# Patient Record
Sex: FEMALE | Race: BLACK OR AFRICAN AMERICAN | NOT HISPANIC OR LATINO
[De-identification: names, ages, dates, MRNs, and addresses within clinical notes are randomized per-mention and may not be internally consistent; named-entity substitution may affect disease eponyms.]

---

## 2017-06-25 PROBLEM — Z00.00 ENCOUNTER FOR PREVENTIVE HEALTH EXAMINATION: Status: ACTIVE | Noted: 2017-06-25

## 2021-07-09 ENCOUNTER — APPOINTMENT (OUTPATIENT)
Dept: OBGYN | Facility: CLINIC | Age: 36
End: 2021-07-09

## 2022-06-14 ENCOUNTER — NON-APPOINTMENT (OUTPATIENT)
Age: 37
End: 2022-06-14

## 2022-07-01 ENCOUNTER — NON-APPOINTMENT (OUTPATIENT)
Age: 37
End: 2022-07-01

## 2022-07-03 ENCOUNTER — EMERGENCY (EMERGENCY)
Facility: HOSPITAL | Age: 37
LOS: 0 days | Discharge: HOME | End: 2022-07-03
Attending: EMERGENCY MEDICINE | Admitting: EMERGENCY MEDICINE

## 2022-07-03 VITALS
WEIGHT: 179.9 LBS | SYSTOLIC BLOOD PRESSURE: 153 MMHG | RESPIRATION RATE: 16 BRPM | OXYGEN SATURATION: 99 % | HEART RATE: 97 BPM | TEMPERATURE: 98 F | DIASTOLIC BLOOD PRESSURE: 83 MMHG

## 2022-07-03 DIAGNOSIS — K08.89 OTHER SPECIFIED DISORDERS OF TEETH AND SUPPORTING STRUCTURES: ICD-10-CM

## 2022-07-03 DIAGNOSIS — Z20.822 CONTACT WITH AND (SUSPECTED) EXPOSURE TO COVID-19: ICD-10-CM

## 2022-07-03 DIAGNOSIS — K02.9 DENTAL CARIES, UNSPECIFIED: ICD-10-CM

## 2022-07-03 LAB
APPEARANCE UR: CLEAR — SIGNIFICANT CHANGE UP
BACTERIA # UR AUTO: ABNORMAL
BILIRUB UR-MCNC: NEGATIVE — SIGNIFICANT CHANGE UP
COLOR SPEC: YELLOW — SIGNIFICANT CHANGE UP
DIFF PNL FLD: NEGATIVE — SIGNIFICANT CHANGE UP
FLUAV AG NPH QL: SIGNIFICANT CHANGE UP
FLUBV AG NPH QL: SIGNIFICANT CHANGE UP
GLUCOSE UR QL: NEGATIVE — SIGNIFICANT CHANGE UP
KETONES UR-MCNC: NEGATIVE — SIGNIFICANT CHANGE UP
LEUKOCYTE ESTERASE UR-ACNC: NEGATIVE — SIGNIFICANT CHANGE UP
NITRITE UR-MCNC: NEGATIVE — SIGNIFICANT CHANGE UP
PH UR: 6 — SIGNIFICANT CHANGE UP (ref 5–8)
PROT UR-MCNC: ABNORMAL
RBC CASTS # UR COMP ASSIST: 0 /HPF — SIGNIFICANT CHANGE UP (ref 0–4)
RSV RNA NPH QL NAA+NON-PROBE: SIGNIFICANT CHANGE UP
SARS-COV-2 RNA SPEC QL NAA+PROBE: SIGNIFICANT CHANGE UP
SP GR SPEC: 1.02 — SIGNIFICANT CHANGE UP (ref 1.01–1.03)
UROBILINOGEN FLD QL: SIGNIFICANT CHANGE UP
WBC UR QL: 5 /HPF — SIGNIFICANT CHANGE UP (ref 0–5)

## 2022-07-03 PROCEDURE — 99284 EMERGENCY DEPT VISIT MOD MDM: CPT

## 2022-07-03 RX ORDER — AMOXICILLIN 250 MG/5ML
1 SUSPENSION, RECONSTITUTED, ORAL (ML) ORAL
Qty: 21 | Refills: 0
Start: 2022-07-03 | End: 2022-07-09

## 2022-07-03 NOTE — ED PROVIDER NOTE - CLINICAL SUMMARY MEDICAL DECISION MAKING FREE TEXT BOX
Patient with dental pain as well as URI symptoms.  Patient seen by dental block administered will change antibiotics to amoxicillin.  Viral swab as well as UA negative here.

## 2022-07-03 NOTE — ED PROVIDER NOTE - OBJECTIVE STATEMENT
36-year-old female presents ED complaining of right upper dental pain for a month with increasing intensity today.  Patient also complaining of cough runny nose and fever and chills.  Patient states he was started on Keflex for UTI after virtual visit yesterday.  No chest pain, shortness of breath, abdominal pain, back pain.

## 2022-07-03 NOTE — ED PROVIDER NOTE - PATIENT PORTAL LINK FT
You can access the FollowMyHealth Patient Portal offered by [facility] by registering at the following website: [email or web address]. By joining [facility]’s FollowMyHealth portal, you will also be able to view your health information using other applications (apps) compatible with our system.

## 2022-07-03 NOTE — ED PROVIDER NOTE - ATTENDING APP SHARED VISIT CONTRIBUTION OF CARE
36-year-old female here evaluation of right upper dental pain associated with cough runny nose fever chills.  Patient was recently started on Keflex UTI from a virtual visit but here when she started fever 1 to get checked out.  Patient has no lower back pain shortness of breath.  Agree with above exam.  Impression  Patient with dental pain as well as URI symptoms.  Patient seen by dental block administered will change antibiotics to amoxicillin.  Viral swab as well as UA negative here.

## 2022-07-03 NOTE — CONSULT NOTE ADULT - SUBJECTIVE AND OBJECTIVE BOX
Patient is a 36y old  Female who presents with a chief complaint of "dental pain on my upper right side"    HPI: Patient reported dental pain, a toothache for a few weeks on upper right side of maxilla that has progressively getting worse      PAST MEDICAL & SURGICAL HISTORY:  No pertinent past medical history        ( -  ) heart valve replacement  ( -  ) joint replacement  ( -  ) pregnancy    MEDICATIONS  (STANDING):    MEDICATIONS  (PRN):      Allergies    No Known Allergies    Intolerances        FAMILY HISTORY:      *SOCIAL HISTORY: (   ) Tobacco; (   ) ETOH    *Last Dental Visit:    Vital Signs Last 24 Hrs  T(C): 36.9 (2022 16:42), Max: 36.9 (2022 16:42)  T(F): 98.5 (2022 16:42), Max: 98.5 (2022 16:42)  HR: 97 (2022 16:42) (97 - 97)  BP: 153/83 (2022 16:42) (153/83 - 153/83)  BP(mean): --  RR: 16 (2022 16:42) (16 - 16)  SpO2: 99% (2022 16:42) (99% - 99%)    LABS:            Urinalysis Basic - ( 2022 17:39 )    Color: Yellow / Appearance: Clear / S.020 / pH: x  Gluc: x / Ketone: Negative  / Bili: Negative / Urobili: <2 mg/dL   Blood: x / Protein: 30 mg/dL / Nitrite: Negative   Leuk Esterase: Negative / RBC: 0 /HPF / WBC 5 /HPF   Sq Epi: x / Non Sq Epi: x / Bacteria: Few          EOE:  TMJ (  - ) clicks                     ( -  ) pops                     (  - ) crepitus             Mandible <<FROM>>             Facial bones and MOM <<grossly intact>>             ( - ) trismus             (  - ) lymphadenopathy             ( -  ) swelling             ( -  ) asymmetry             ( -  ) palpation             (  - ) dyspnea             (  - ) dysphagia             (  - ) loss of consciousness    IOE:  <<permanent dentition: multiple carious teeth           hard/soft palate:  (  - ) palatal torus, <<No pathology noted>>           tongue/FOM <<No pathology noted>>           labial/buccal mucosa <<No pathology noted>>           ( +  ) percussion           (  + ) palpation           (  - ) swelling            (  - ) abscess           (  - ) sinus tract    Dentition present: << permanet  >>  Caries: yes        *DENTAL RADIOGRAPHS: N/A    RADIOLOGY & ADDITIONAL STUDIES:    *ASSESSMENT: root remnants and carious lesions for upper right molars      *PLAN:    PROCEDURE:   Local anesthesia given to the patient for Upper right area by molar area. one and a half Carpules of 0.5% Marcaine with 1:200,000 epinephrine given via buccal infiltration.    RECOMMENDATIONS:  1) Amoxicillin 500 mg tablet, quantity 21 tablets, 7 day supply. Sig: Take one tablet by oral route every 8 hours for 7 days.   Ibuprofen 600 mg tablet, quantity 20, 5 day supply. Sig: take 1 tablet by oral route every 6 hours as needed for pain. Take with food.   2) Dental F/U with outpatient dentist for comprehensive dental care.   3) If any difficulty swallowing/breathing, fever occur, return to ER.     Resident Name, pager #[phone]   Patient is a 36y old  Female who presents with a chief complaint of "dental pain on my upper right side"    HPI: Patient reported dental pain, a toothache for a few weeks on upper right side of maxilla that has progressively getting worse      PAST MEDICAL & SURGICAL HISTORY:  No pertinent past medical history        ( -  ) heart valve replacement  ( -  ) joint replacement  ( -  ) pregnancy    MEDICATIONS  (STANDING):    MEDICATIONS  (PRN):      Allergies    No Known Allergies    Intolerances        FAMILY HISTORY:      *SOCIAL HISTORY: (   ) Tobacco; (   ) ETOH    *Last Dental Visit:    Vital Signs Last 24 Hrs  T(C): 36.9 (2022 16:42), Max: 36.9 (2022 16:42)  T(F): 98.5 (2022 16:42), Max: 98.5 (2022 16:42)  HR: 97 (2022 16:42) (97 - 97)  BP: 153/83 (2022 16:42) (153/83 - 153/83)  BP(mean): --  RR: 16 (2022 16:42) (16 - 16)  SpO2: 99% (2022 16:42) (99% - 99%)    LABS:            Urinalysis Basic - ( 2022 17:39 )    Color: Yellow / Appearance: Clear / S.020 / pH: x  Gluc: x / Ketone: Negative  / Bili: Negative / Urobili: <2 mg/dL   Blood: x / Protein: 30 mg/dL / Nitrite: Negative   Leuk Esterase: Negative / RBC: 0 /HPF / WBC 5 /HPF   Sq Epi: x / Non Sq Epi: x / Bacteria: Few          EOE:  TMJ (  - ) clicks                     ( -  ) pops                     (  - ) crepitus             Mandible <<FROM>>             Facial bones and MOM <<grossly intact>>             ( - ) trismus             (  - ) lymphadenopathy             ( -  ) swelling             ( -  ) asymmetry             ( -  ) palpation             (  - ) dyspnea             (  - ) dysphagia             (  - ) loss of consciousness    IOE:  <<permanent dentition: multiple carious teeth           hard/soft palate:  (  - ) palatal torus, <<No pathology noted>>           tongue/FOM <<No pathology noted>>           labial/buccal mucosa <<No pathology noted>>           ( +  ) percussion           (  + ) palpation           (  - ) swelling            (  - ) abscess           (  - ) sinus tract    Dentition present: << permanet  >>  Caries: yes        *DENTAL RADIOGRAPHS: N/A    RADIOLOGY & ADDITIONAL STUDIES:    *ASSESSMENT: root remnants and carious lesions for upper right molars      *PLAN:    PROCEDURE:   Local anesthesia given to the patient for Upper right area by molar area. one and a half Carpules of 0.5% Marcaine with 1:200,000 epinephrine given via buccal infiltration.    RECOMMENDATIONS:  1) Amoxicillin 500 mg tablet, quantity 21 tablets, 7 day supply. Sig: Take one tablet by oral route every 8 hours for 7 days.   Ibuprofen 600 mg tablet, quantity 20, 5 day supply. Sig: take 1 tablet by oral route every 6 hours as needed for pain. Take with food.   2) Dental F/U with outpatient dentist for comprehensive dental care.   3) If any difficulty swallowing/breathing, fever occur, return to ER.     Resident Name: Jadon Elizalde DMD, pager #[phone]

## 2022-07-03 NOTE — ED PROVIDER NOTE - NSFOLLOWUPINSTRUCTIONS_ED_ALL_ED_FT
Dental Pain    Dental pain (toothache) may be caused by many things including tooth decay (cavities or caries), abscess or infection, or trauma. If you were prescribed an antibiotic medicine, finish all of it even if you start to feel better. Rinsing your mouth with salt water or applying ice to the painful area of your face may help with the pain. Follow up with a dentist is important in ensuring good oral health and preventing the worsening of dental disease.    SEEK IMMEDIATE MEDICAL CARE IF YOU HAVE ANY OF THE FOLLOWING SYMPTOMS: unable to open your mouth, trouble breathing or swallowing, fever, or swelling of the face, neck, or jaw.    Viral Respiratory Infection    A viral respiratory infection is an illness that affects parts of the body used for breathing, like the lungs, nose, and throat. It is caused by a germ called a virus. Symptoms can include runny nose, coughing, sneezing, fatigue, body aches, sore throat, fever, or headache. Over the counter medicine can be used to manage the symptoms but the infection typically goes away on its own in 5 to 10 days.     SEEK IMMEDIATE MEDICAL CARE IF YOU HAVE ANY OF THE FOLLOWING SYMPTOMS: shortness of breath, chest pain, fever over 10 days, or lightheadedness/dizziness.

## 2023-05-19 NOTE — ED PROVIDER NOTE - CPE EDP SKIN NORM
flexibility, core stabilization, muscle imbalances & hypomobility in LB region, and lumbar mobility. Treatment/Activity Tolerance:  [x] Patient able to complete tx  [] Patient limited by fatique  [] Patient limited by pain   [] Patient limited by other medical complications  [] Other:     Prognosis: [x] Good [] Fair  [] Poor    Patient Requires Follow-up: [x] Yes  [] No    PLAN: See eval. PT 2x / week for 5 weeks. [x] Continue per plan of care [] Alter current plan (see comments)  [] Plan of care initiated [] Hold pending MD visit [] Discharge    Electronically signed by: Kavtia Gannon, SPT  Therapist was present, directed the patient's care, made skilled judgement, and was responsible for assessment and treatment of the patient. Estefani Bird, PT, DPT    Note: If patient does not return for scheduled/ recommended follow up visits, this note will serve as a discharge from care along with most recent update on progress.
normal...

## 2025-01-13 NOTE — ED PROVIDER NOTE - NS ED ATTENDING STATEMENT MOD
(V5) oriented
This was a shared visit with the MJ. I reviewed and verified the documentation and independently performed the documented:

## 2025-01-15 ENCOUNTER — NON-APPOINTMENT (OUTPATIENT)
Age: 40
End: 2025-01-15

## 2025-06-30 ENCOUNTER — NON-APPOINTMENT (OUTPATIENT)
Age: 40
End: 2025-06-30

## 2025-07-23 ENCOUNTER — OUTPATIENT (OUTPATIENT)
Dept: OUTPATIENT SERVICES | Facility: HOSPITAL | Age: 40
LOS: 1 days | End: 2025-07-23

## 2025-07-23 DIAGNOSIS — K01.1 IMPACTED TEETH: ICD-10-CM

## 2025-07-23 PROBLEM — Z78.9 OTHER SPECIFIED HEALTH STATUS: Chronic | Status: ACTIVE | Noted: 2022-07-03

## 2025-07-23 PROCEDURE — D0330: CPT

## 2025-08-25 ENCOUNTER — NON-APPOINTMENT (OUTPATIENT)
Age: 40
End: 2025-08-25